# Patient Record
Sex: MALE | Race: BLACK OR AFRICAN AMERICAN | NOT HISPANIC OR LATINO | URBAN - METROPOLITAN AREA
[De-identification: names, ages, dates, MRNs, and addresses within clinical notes are randomized per-mention and may not be internally consistent; named-entity substitution may affect disease eponyms.]

---

## 2018-09-11 ENCOUNTER — EMERGENCY (EMERGENCY)
Facility: HOSPITAL | Age: 28
LOS: 1 days | Discharge: HOME | End: 2018-09-11
Attending: EMERGENCY MEDICINE
Payer: COMMERCIAL

## 2018-09-11 VITALS
WEIGHT: 199.96 LBS | RESPIRATION RATE: 16 BRPM | HEIGHT: 73 IN | DIASTOLIC BLOOD PRESSURE: 94 MMHG | HEART RATE: 77 BPM | TEMPERATURE: 98 F | OXYGEN SATURATION: 99 % | SYSTOLIC BLOOD PRESSURE: 153 MMHG

## 2018-09-11 VITALS
SYSTOLIC BLOOD PRESSURE: 136 MMHG | DIASTOLIC BLOOD PRESSURE: 84 MMHG | RESPIRATION RATE: 16 BRPM | TEMPERATURE: 98 F | OXYGEN SATURATION: 100 % | HEART RATE: 62 BPM

## 2018-09-11 LAB
ALBUMIN SERPL ELPH-MCNC: 4.6 G/DL — SIGNIFICANT CHANGE UP (ref 3.3–5)
ALP SERPL-CCNC: 58 U/L — SIGNIFICANT CHANGE UP (ref 40–120)
ALT FLD-CCNC: 25 U/L — SIGNIFICANT CHANGE UP (ref 10–45)
ANION GAP SERPL CALC-SCNC: 12 MMOL/L — SIGNIFICANT CHANGE UP (ref 5–17)
AST SERPL-CCNC: 22 U/L — SIGNIFICANT CHANGE UP (ref 10–40)
BASOPHILS # BLD AUTO: 0.1 K/UL — SIGNIFICANT CHANGE UP (ref 0–0.2)
BASOPHILS NFR BLD AUTO: 0.9 % — SIGNIFICANT CHANGE UP (ref 0–2)
BILIRUB SERPL-MCNC: 0.2 MG/DL — SIGNIFICANT CHANGE UP (ref 0.2–1.2)
BUN SERPL-MCNC: 17 MG/DL — SIGNIFICANT CHANGE UP (ref 7–23)
CALCIUM SERPL-MCNC: 9.5 MG/DL — SIGNIFICANT CHANGE UP (ref 8.4–10.5)
CHLORIDE SERPL-SCNC: 100 MMOL/L — SIGNIFICANT CHANGE UP (ref 96–108)
CO2 SERPL-SCNC: 26 MMOL/L — SIGNIFICANT CHANGE UP (ref 22–31)
CREAT SERPL-MCNC: 1.36 MG/DL — HIGH (ref 0.5–1.3)
EOSINOPHIL # BLD AUTO: 0.1 K/UL — SIGNIFICANT CHANGE UP (ref 0–0.5)
EOSINOPHIL NFR BLD AUTO: 1.7 % — SIGNIFICANT CHANGE UP (ref 0–6)
GLUCOSE SERPL-MCNC: 85 MG/DL — SIGNIFICANT CHANGE UP (ref 70–99)
HCT VFR BLD CALC: 49.3 % — SIGNIFICANT CHANGE UP (ref 39–50)
HGB BLD-MCNC: 15.7 G/DL — SIGNIFICANT CHANGE UP (ref 13–17)
LIDOCAIN IGE QN: 66 U/L — HIGH (ref 7–60)
LYMPHOCYTES # BLD AUTO: 2.1 K/UL — SIGNIFICANT CHANGE UP (ref 1–3.3)
LYMPHOCYTES # BLD AUTO: 31.3 % — SIGNIFICANT CHANGE UP (ref 13–44)
MAGNESIUM SERPL-MCNC: 2.1 MG/DL — SIGNIFICANT CHANGE UP (ref 1.6–2.6)
MCHC RBC-ENTMCNC: 28.3 PG — SIGNIFICANT CHANGE UP (ref 27–34)
MCHC RBC-ENTMCNC: 31.8 GM/DL — LOW (ref 32–36)
MCV RBC AUTO: 89 FL — SIGNIFICANT CHANGE UP (ref 80–100)
MONOCYTES # BLD AUTO: 0.6 K/UL — SIGNIFICANT CHANGE UP (ref 0–0.9)
MONOCYTES NFR BLD AUTO: 8.9 % — SIGNIFICANT CHANGE UP (ref 2–14)
NEUTROPHILS # BLD AUTO: 3.8 K/UL — SIGNIFICANT CHANGE UP (ref 1.8–7.4)
NEUTROPHILS NFR BLD AUTO: 57.2 % — SIGNIFICANT CHANGE UP (ref 43–77)
PLATELET # BLD AUTO: 164 K/UL — SIGNIFICANT CHANGE UP (ref 150–400)
POTASSIUM SERPL-MCNC: 4 MMOL/L — SIGNIFICANT CHANGE UP (ref 3.5–5.3)
POTASSIUM SERPL-SCNC: 4 MMOL/L — SIGNIFICANT CHANGE UP (ref 3.5–5.3)
PROT SERPL-MCNC: 7.3 G/DL — SIGNIFICANT CHANGE UP (ref 6–8.3)
RBC # BLD: 5.54 M/UL — SIGNIFICANT CHANGE UP (ref 4.2–5.8)
RBC # FLD: 13.2 % — SIGNIFICANT CHANGE UP (ref 10.3–14.5)
SODIUM SERPL-SCNC: 138 MMOL/L — SIGNIFICANT CHANGE UP (ref 135–145)
TROPONIN T, HIGH SENSITIVITY RESULT: <6 NG/L — SIGNIFICANT CHANGE UP (ref 0–51)
WBC # BLD: 6.6 K/UL — SIGNIFICANT CHANGE UP (ref 3.8–10.5)
WBC # FLD AUTO: 6.6 K/UL — SIGNIFICANT CHANGE UP (ref 3.8–10.5)

## 2018-09-11 PROCEDURE — 99284 EMERGENCY DEPT VISIT MOD MDM: CPT | Mod: 25

## 2018-09-11 PROCEDURE — 83690 ASSAY OF LIPASE: CPT

## 2018-09-11 PROCEDURE — 84484 ASSAY OF TROPONIN QUANT: CPT

## 2018-09-11 PROCEDURE — 80053 COMPREHEN METABOLIC PANEL: CPT

## 2018-09-11 PROCEDURE — 99285 EMERGENCY DEPT VISIT HI MDM: CPT | Mod: 25

## 2018-09-11 PROCEDURE — 71046 X-RAY EXAM CHEST 2 VIEWS: CPT | Mod: 26

## 2018-09-11 PROCEDURE — 83735 ASSAY OF MAGNESIUM: CPT

## 2018-09-11 PROCEDURE — 85027 COMPLETE CBC AUTOMATED: CPT

## 2018-09-11 PROCEDURE — 93010 ELECTROCARDIOGRAM REPORT: CPT

## 2018-09-11 PROCEDURE — 93005 ELECTROCARDIOGRAM TRACING: CPT

## 2018-09-11 PROCEDURE — 71046 X-RAY EXAM CHEST 2 VIEWS: CPT

## 2018-09-11 PROCEDURE — 96374 THER/PROPH/DIAG INJ IV PUSH: CPT

## 2018-09-11 RX ORDER — KETOROLAC TROMETHAMINE 30 MG/ML
15 SYRINGE (ML) INJECTION ONCE
Qty: 0 | Refills: 0 | Status: DISCONTINUED | OUTPATIENT
Start: 2018-09-11 | End: 2018-09-11

## 2018-09-11 RX ORDER — SODIUM CHLORIDE 9 MG/ML
1000 INJECTION INTRAMUSCULAR; INTRAVENOUS; SUBCUTANEOUS ONCE
Qty: 0 | Refills: 0 | Status: COMPLETED | OUTPATIENT
Start: 2018-09-11 | End: 2018-09-11

## 2018-09-11 RX ORDER — ACETAMINOPHEN 500 MG
975 TABLET ORAL ONCE
Qty: 0 | Refills: 0 | Status: COMPLETED | OUTPATIENT
Start: 2018-09-11 | End: 2018-09-11

## 2018-09-11 RX ADMIN — Medication 15 MILLIGRAM(S): at 22:22

## 2018-09-11 RX ADMIN — Medication 975 MILLIGRAM(S): at 23:47

## 2018-09-11 RX ADMIN — Medication 15 MILLIGRAM(S): at 22:21

## 2018-09-11 RX ADMIN — SODIUM CHLORIDE 2000 MILLILITER(S): 9 INJECTION INTRAMUSCULAR; INTRAVENOUS; SUBCUTANEOUS at 22:22

## 2018-09-11 NOTE — ED PROVIDER NOTE - OBJECTIVE STATEMENT
27yom w/ no PMH had a sudden onset of sharp midsternal chest discomfort while studying in the library, coming in waves at first but now constant, radiates to the left scapula, worse laying back, better leaning forward, unchanged but inspiration, no associated shortness of breath, diaphoresis, nausea, vomiting, abdominal pain. No recent sickness. No fevers or chills. Never had pain like this before.

## 2018-09-11 NOTE — ED ADULT NURSE REASSESSMENT NOTE - NS ED NURSE REASSESS COMMENT FT1
Pt AAOx4, NAD, resting comfortably in bed with mother at bedside. Pt denies headache, dizziness, cough, SOB, abdominal pain, n/v/d, urinary symptoms, fevers, chills, weakness at this time. Pt verbalized understanding to return to ED for CP or SOB. Pt verbalized understanding to take Ibuprofen or Acetaminophen for pain. Pt discharged as per MD, IV removed as per MD, pt ambulated independently out of ED.

## 2018-09-11 NOTE — ED ADULT NURSE NOTE - OBJECTIVE STATEMENT
pt has had chest pain for 1 hour  it radiates to his left arm and is not increased on exertion.  he took 2 alieves prior to arrival

## 2018-09-11 NOTE — ED PROVIDER NOTE - MEDICAL DECISION MAKING DETAILS
Well appearing, healthy male w/ atypical chest pain, no ECG findings to suggest ischemic insult or pericarditis, no VTE risk factors. Will treat symptomatically w/ NSAIDs, check screening labs, XR. Well appearing, healthy male w/ atypical chest pain, no ECG findings to suggest ischemic insult or pericarditis, no VTE risk factors. Will treat symptomatically w/ NSAIDs, check screening labs, XR.  MD Angelina,Attending: pt seen. agree with above HPI/ROS/PE. highly atypical sounding chest pain and normal EKG. for CXR r/o PTX. , screening labs analgesics and likely DC home for PCP followup

## 2018-09-11 NOTE — ED ADULT TRIAGE NOTE - CHIEF COMPLAINT QUOTE
chest pain x 1 hour while studying at [employer] (that has resolved) and radiating into shoulder chest pain radiating into left shoulder x 1 hour while studying and [employer] (that has resolved)

## 2021-10-08 ENCOUNTER — EMERGENCY (EMERGENCY)
Facility: HOSPITAL | Age: 31
LOS: 1 days | Discharge: HOME | End: 2021-10-08
Attending: EMERGENCY MEDICINE
Payer: MEDICAID

## 2021-10-08 VITALS
DIASTOLIC BLOOD PRESSURE: 101 MMHG | TEMPERATURE: 99 F | OXYGEN SATURATION: 98 % | WEIGHT: 223.99 LBS | SYSTOLIC BLOOD PRESSURE: 163 MMHG | RESPIRATION RATE: 20 BRPM | HEIGHT: 73 IN | HEART RATE: 80 BPM

## 2021-10-08 VITALS
DIASTOLIC BLOOD PRESSURE: 67 MMHG | SYSTOLIC BLOOD PRESSURE: 114 MMHG | HEART RATE: 60 BPM | TEMPERATURE: 98 F | OXYGEN SATURATION: 98 % | RESPIRATION RATE: 14 BRPM

## 2021-10-08 LAB
ALBUMIN SERPL ELPH-MCNC: 4.6 G/DL — SIGNIFICANT CHANGE UP (ref 3.3–5)
ALP SERPL-CCNC: 70 U/L — SIGNIFICANT CHANGE UP (ref 40–120)
ALT FLD-CCNC: 26 U/L — SIGNIFICANT CHANGE UP (ref 10–45)
ANION GAP SERPL CALC-SCNC: 14 MMOL/L — SIGNIFICANT CHANGE UP (ref 5–17)
APPEARANCE UR: CLEAR — SIGNIFICANT CHANGE UP
AST SERPL-CCNC: 23 U/L — SIGNIFICANT CHANGE UP (ref 10–40)
BACTERIA # UR AUTO: NEGATIVE — SIGNIFICANT CHANGE UP
BASOPHILS # BLD AUTO: 0.07 K/UL — SIGNIFICANT CHANGE UP (ref 0–0.2)
BASOPHILS NFR BLD AUTO: 1 % — SIGNIFICANT CHANGE UP (ref 0–2)
BILIRUB SERPL-MCNC: 0.2 MG/DL — SIGNIFICANT CHANGE UP (ref 0.2–1.2)
BILIRUB UR-MCNC: NEGATIVE — SIGNIFICANT CHANGE UP
BUN SERPL-MCNC: 15 MG/DL — SIGNIFICANT CHANGE UP (ref 7–23)
CALCIUM SERPL-MCNC: 9.5 MG/DL — SIGNIFICANT CHANGE UP (ref 8.4–10.5)
CHLORIDE SERPL-SCNC: 101 MMOL/L — SIGNIFICANT CHANGE UP (ref 96–108)
CO2 SERPL-SCNC: 23 MMOL/L — SIGNIFICANT CHANGE UP (ref 22–31)
COLOR SPEC: COLORLESS — SIGNIFICANT CHANGE UP
CREAT SERPL-MCNC: 1.24 MG/DL — SIGNIFICANT CHANGE UP (ref 0.5–1.3)
DIFF PNL FLD: NEGATIVE — SIGNIFICANT CHANGE UP
EOSINOPHIL # BLD AUTO: 0.12 K/UL — SIGNIFICANT CHANGE UP (ref 0–0.5)
EOSINOPHIL NFR BLD AUTO: 1.7 % — SIGNIFICANT CHANGE UP (ref 0–6)
EPI CELLS # UR: 0 /HPF — SIGNIFICANT CHANGE UP
GLUCOSE SERPL-MCNC: 113 MG/DL — HIGH (ref 70–99)
GLUCOSE UR QL: NEGATIVE — SIGNIFICANT CHANGE UP
HCT VFR BLD CALC: 47.4 % — SIGNIFICANT CHANGE UP (ref 39–50)
HGB BLD-MCNC: 14.8 G/DL — SIGNIFICANT CHANGE UP (ref 13–17)
IMM GRANULOCYTES NFR BLD AUTO: 0.1 % — SIGNIFICANT CHANGE UP (ref 0–1.5)
KETONES UR-MCNC: NEGATIVE — SIGNIFICANT CHANGE UP
LEUKOCYTE ESTERASE UR-ACNC: NEGATIVE — SIGNIFICANT CHANGE UP
LYMPHOCYTES # BLD AUTO: 2.62 K/UL — SIGNIFICANT CHANGE UP (ref 1–3.3)
LYMPHOCYTES # BLD AUTO: 36.5 % — SIGNIFICANT CHANGE UP (ref 13–44)
MCHC RBC-ENTMCNC: 27.6 PG — SIGNIFICANT CHANGE UP (ref 27–34)
MCHC RBC-ENTMCNC: 31.2 GM/DL — LOW (ref 32–36)
MCV RBC AUTO: 88.4 FL — SIGNIFICANT CHANGE UP (ref 80–100)
MONOCYTES # BLD AUTO: 0.44 K/UL — SIGNIFICANT CHANGE UP (ref 0–0.9)
MONOCYTES NFR BLD AUTO: 6.1 % — SIGNIFICANT CHANGE UP (ref 2–14)
NEUTROPHILS # BLD AUTO: 3.92 K/UL — SIGNIFICANT CHANGE UP (ref 1.8–7.4)
NEUTROPHILS NFR BLD AUTO: 54.6 % — SIGNIFICANT CHANGE UP (ref 43–77)
NITRITE UR-MCNC: NEGATIVE — SIGNIFICANT CHANGE UP
NRBC # BLD: 0 /100 WBCS — SIGNIFICANT CHANGE UP (ref 0–0)
PH UR: 6.5 — SIGNIFICANT CHANGE UP (ref 5–8)
PLATELET # BLD AUTO: 193 K/UL — SIGNIFICANT CHANGE UP (ref 150–400)
POTASSIUM SERPL-MCNC: 4.2 MMOL/L — SIGNIFICANT CHANGE UP (ref 3.5–5.3)
POTASSIUM SERPL-SCNC: 4.2 MMOL/L — SIGNIFICANT CHANGE UP (ref 3.5–5.3)
PROT SERPL-MCNC: 7.4 G/DL — SIGNIFICANT CHANGE UP (ref 6–8.3)
PROT UR-MCNC: NEGATIVE — SIGNIFICANT CHANGE UP
RBC # BLD: 5.36 M/UL — SIGNIFICANT CHANGE UP (ref 4.2–5.8)
RBC # FLD: 13.9 % — SIGNIFICANT CHANGE UP (ref 10.3–14.5)
RBC CASTS # UR COMP ASSIST: 0 /HPF — SIGNIFICANT CHANGE UP (ref 0–4)
SODIUM SERPL-SCNC: 138 MMOL/L — SIGNIFICANT CHANGE UP (ref 135–145)
SP GR SPEC: 1.01 — LOW (ref 1.01–1.02)
UROBILINOGEN FLD QL: NEGATIVE — SIGNIFICANT CHANGE UP
WBC # BLD: 7.18 K/UL — SIGNIFICANT CHANGE UP (ref 3.8–10.5)
WBC # FLD AUTO: 7.18 K/UL — SIGNIFICANT CHANGE UP (ref 3.8–10.5)
WBC UR QL: 0 /HPF — SIGNIFICANT CHANGE UP (ref 0–5)

## 2021-10-08 PROCEDURE — 85025 COMPLETE CBC W/AUTO DIFF WBC: CPT

## 2021-10-08 PROCEDURE — 99284 EMERGENCY DEPT VISIT MOD MDM: CPT

## 2021-10-08 PROCEDURE — 80053 COMPREHEN METABOLIC PANEL: CPT

## 2021-10-08 PROCEDURE — 81001 URINALYSIS AUTO W/SCOPE: CPT

## 2021-10-08 RX ORDER — ACETAMINOPHEN 500 MG
975 TABLET ORAL ONCE
Refills: 0 | Status: COMPLETED | OUTPATIENT
Start: 2021-10-08 | End: 2021-10-08

## 2021-10-08 RX ORDER — SENNA PLUS 8.6 MG/1
1 TABLET ORAL ONCE
Refills: 0 | Status: COMPLETED | OUTPATIENT
Start: 2021-10-08 | End: 2021-10-08

## 2021-10-08 RX ORDER — SIMETHICONE 80 MG/1
80 TABLET, CHEWABLE ORAL ONCE
Refills: 0 | Status: COMPLETED | OUTPATIENT
Start: 2021-10-08 | End: 2021-10-08

## 2021-10-08 RX ORDER — PHENYLEPHRINE-SHARK LIVER OIL-MINERAL OIL-PETROLATUM RECTAL OINTMENT
1 OINTMENT (GRAM) RECTAL ONCE
Refills: 0 | Status: COMPLETED | OUTPATIENT
Start: 2021-10-08 | End: 2021-10-08

## 2021-10-08 RX ADMIN — PHENYLEPHRINE-SHARK LIVER OIL-MINERAL OIL-PETROLATUM RECTAL OINTMENT 1 SUPPOSITORY(S): at 03:39

## 2021-10-08 RX ADMIN — SENNA PLUS 1 TABLET(S): 8.6 TABLET ORAL at 03:43

## 2021-10-08 RX ADMIN — SIMETHICONE 80 MILLIGRAM(S): 80 TABLET, CHEWABLE ORAL at 03:37

## 2021-10-08 RX ADMIN — Medication 975 MILLIGRAM(S): at 03:38

## 2021-10-08 NOTE — ED PROVIDER NOTE - NS ED ROS FT
GENERAL: No fever, chills  CARDIAC: no chest pain/pressure  PULMONARY: no cough, SOB  GI: +abdominal pain, no n/v/d  : no dysuria, no hematuria  SKIN: no rashes, no ecchymosis  NEURO: no lightheadedness

## 2021-10-08 NOTE — ED PROVIDER NOTE - PROGRESS NOTE DETAILS
[place] PGY1: Pt abdomen reassessed to be less TTP. [place] PGY1: Pt abdomen reassessed to be less TTP. Pt given strict return precautions and is comfortable with DC.

## 2021-10-08 NOTE — ED PROVIDER NOTE - OBJECTIVE STATEMENT
30 yo M with no PMh presents to the ED with 1 day of worsening crampy abdominal pain and one episode of bloody bowel movement. Denies fever, n/v/d, dysuria, previous abdominal surgeries. 30 yo M with no PMh presents to the ED with 1 day of worsening crampy, nonradiating abdominal pain and one episode of bloody bowel movement. Denies fever, n/v/d, dysuria, previous abdominal surgeries. 30 yo M with no PMh presents to the ED with 1 day of worsening crampy, non-radiating abdominal pain and one episode of bloody bowel movement. Denies fever, n/v/d, dysuria, previous abdominal surgeries.

## 2021-10-08 NOTE — ED PROVIDER NOTE - PHYSICAL EXAMINATION
GENl: Patient awake alert NAD.   HEENT: normocephalic, atraumatic, no scleral icterus, moist MM  CARDIAC: RRR, S1, S2, no murmur.   PULM: CTA B/L no wheeze, rhonchi, rales.   ABD: soft, TTP in RLQ, ND, no rebound no guarding, no CVA tenderness.   MSK: Moving all extremities,   NEURO: A&Ox3   SKIN: warm, dry, no rash.  RECTAL: 2 1cm external hemorrhoids at the 7 and 8 o'clock position, not erythematous or thrombosed. No internal hemorrhoids or bleeding, no stool in the rectal vault

## 2021-10-08 NOTE — ED PROVIDER NOTE - ATTENDING CONTRIBUTION TO CARE
Afebrile. Awake and Alert. Lungs CTA. Heart RRR. Abdomen soft, +RLQ TTP, ND. CN II-XII grossly intact. Moves all extremities without lateralization.    r/o appendicitis Afebrile. Awake and Alert. Lungs CTA. Heart RRR. Abdomen soft, +RLQ TTP mild without guarding or rebound, ND. CN II-XII grossly intact. Moves all extremities without lateralization. Rectal exam (resident + attending): 2 hemorrhoids at 6 and 7 o'clock, non-thrombosed.    Blood in stools likely due to hemorrhoids: supportive care, prep H, stool softeners  Abdominal pain improved on serial abdominal exam and shared medical decision making given ALARA will watch and wait with return precautions fo CT a/p r/o appy

## 2021-10-08 NOTE — ED PROVIDER NOTE - PATIENT PORTAL LINK FT
You can access the FollowMyHealth Patient Portal offered by [facility] by registering at the following website: [email or web address]. By joining [facility]’s FollowMyHealth portal, you will also be able to view your health information using other applications (apps) compatible with our system.

## 2021-10-08 NOTE — ED PROVIDER NOTE - NSFOLLOWUPINSTRUCTIONS_ED_ALL_ED_FT
YOU WERE SEEN IN THE ED FOR: ABDOMINAL PAIN    PLEASE TAKE PREPARATION H AND STOOL SOFTENERS FOR THE HEMORRHOIDS    FOR PAIN, YOU MAY TAKE TYLENOL (acetaminophen) AND/OR IBUPROFEN (advil or motrin). FOLLOW THE INSTRUCTIONS ON THE LABEL/CONTAINER.    PLEASE FOLLOW UP WITH YOUR PRIVATE PHYSICIAN WITHIN THE NEXT 48 HOURS. BRING COPIES OF YOUR RESULTS.    PERSON FROM THE HOSPITAL WILL CALL IN ORDER TO HELP SET UP AN APPOINTMENT WITH COLORECTAL SURGERY FOR THE HEMORRHOIDS    RETURN TO THE EMERGENCY DEPARTMENT IF YOU EXPERIENCE ANY NEW/CONCERNING/WORSENING SYMPTOMS SUCH AS BUT NOT LIMITED TO: FEVER, WORSENING ABDOMINAL PAIN, NAUSEA AND VOMITING

## 2021-10-08 NOTE — ED ADULT NURSE NOTE - OBJECTIVE STATEMENT
Pt is a 32y/o male A&Ox3 speaking coherently ambulates independently ID verified who comes in via self w/ c/o RLQ pain that started this morning w/ one episode of bloody stool. On assessment, RLQ is soft but mildly tender. Pt describes the pain as constant and dull but sharp when he changes positions. No  symptoms. Denies any chest pain or SOB, does not appear to be in any acute distress. States he started Invisalign yesterday and has been taking Tylenol as needed for pain/discomfort. Not taking any other medications. Denies headache, vision changes, nausea, vomiting, fevers, chills, recent illness travel or fall. All needs met. Safety and comfort measures provided. Bed locked and in lowest position, side rails up for safety. Call bell within reach.

## 2021-10-08 NOTE — ED ADULT NURSE NOTE - NS ED NURSE LEVEL OF CONSCIOUSNESS MENTAL STATUS
TC to patient to schedule . It was difficult to understand patient as she wasn't using provided , but she was told to do  on . SK post-call this day and 830 time is already in use. Routing to provider to see if she would like to do  this day.  Fiordaliza Bull, Surgery Coordinator      Awake/Alert/Cooperative

## 2023-09-19 NOTE — ED ADULT NURSE NOTE - NSFALLRSKASSESSTYPE_ED_ALL_ED
yes
Initial (On Arrival)
Mauc Instructions: By selecting yes to the question below the MAUC number will be added into the note.  This will be calculated automatically based on the diagnosis chosen, the size entered, the body zone selected (H,M,L) and the specific indications you chose. You will also have the option to override the Mohs AUC if you disagree with the automatically calculated number and this option is found in the Case Summary tab.

## 2024-11-15 NOTE — ED ADULT NURSE NOTE - PAIN RATING/NUMBER SCALE (0-10): REST
DISCHARGE SUMMARY:   Patient seen and examined. Probable diagnosis, differential diagnosis, treatment, treatment options, and probable complications were discussed and explained to patient. he was to take medication/s associated with this visit. he may take over-the-counter pain and/or fever medication if needed. Advised increased oral fluid intake (2 liters of water or more per day). Reinforced to continue personal hygiene. Patient to return to clinic if there is worsening or persistence of symptoms. Patient verbalized understanding.    Patient to come back in 7 - 10 days if needed for worsening symptoms.   4